# Patient Record
Sex: FEMALE | Race: WHITE
[De-identification: names, ages, dates, MRNs, and addresses within clinical notes are randomized per-mention and may not be internally consistent; named-entity substitution may affect disease eponyms.]

---

## 2020-10-19 ENCOUNTER — HOSPITAL ENCOUNTER (EMERGENCY)
Dept: HOSPITAL 54 - ER | Age: 55
Discharge: HOME | End: 2020-10-19
Payer: MEDICAID

## 2020-10-19 VITALS — BODY MASS INDEX: 26.68 KG/M2 | WEIGHT: 145 LBS | HEIGHT: 62 IN

## 2020-10-19 VITALS — DIASTOLIC BLOOD PRESSURE: 65 MMHG | SYSTOLIC BLOOD PRESSURE: 103 MMHG
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
## 2020-10-19 DIAGNOSIS — S89.82XA: Primary | ICD-10-CM
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
## 2020-10-19 NOTE — NUR
X1 PT BIBSELF C/O L LOWER EXT PAIN X1 WEEK PROGRESSIVELY GETTING WORSE. PT HAS 
DIFFICULTY BEARING WEIGHT TO LOWER EXT. PT AAOX4. RESPIRATIONS EVEN AND 
UNLABORED. SKIN INTACT. VITAL SIGNS STABLE. NO ACUTE DISTRESS NOTED AT THIS 
TIME. WILL CONTINUE TO MONITOR

## 2021-05-13 ENCOUNTER — HOSPITAL ENCOUNTER (EMERGENCY)
Dept: HOSPITAL 54 - ER | Age: 56
Discharge: HOME | End: 2021-05-13
Payer: SELF-PAY

## 2021-05-13 VITALS — WEIGHT: 145 LBS | BODY MASS INDEX: 25.69 KG/M2 | HEIGHT: 63 IN

## 2021-05-13 VITALS — SYSTOLIC BLOOD PRESSURE: 128 MMHG | DIASTOLIC BLOOD PRESSURE: 78 MMHG

## 2021-05-13 DIAGNOSIS — S63.592A: Primary | ICD-10-CM
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
